# Patient Record
(demographics unavailable — no encounter records)

---

## 2025-03-27 NOTE — HISTORY OF PRESENT ILLNESS
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  Was recently admitted to [facility] 1/12/2025 with new onset A-fib with RVA.  Presents today to establish care.  The patient is under stress and that she developed palpitation went to the emergency room.  She was found to be in atrial fibrillation.  That time she had all the workup and echocardiogram and now she is doing okay.  At that time she was on a lot of stress lacking split sleep and taken a lot of coffee.  She is not short of breath at rest but sometimes she feels short of breath with exertion.  She was not allowed to exercise for now. [ESS] : 0

## 2025-03-27 NOTE — ASSESSMENT
[FreeTextEntry1] :   Distant exertion  I discussed the case in detail with the patient.  I reviewed all the notes from [facility].  Patient was admitted with atrial fibrillation most likely related to sleep deprivation and stress and extra caffeine.  The echocardiogram was unremarkable.  No evidence of pulmonary hypertension on the echocardiogram.  The chest x-ray at [facility] was normal and also the one in today.  PFT was normal with normal 6-minute walk test.  No evidence of underlying obstructive sleep apnea.  At this point and indicated to the patient that there is no indication for albuterol.  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  I discussed and need to increase her activity as tolerated gradually and to monitor her blood pressure.   Patient was instructed to continue on anticoagulation and until cleared by cardiology

## 2025-03-27 NOTE — HISTORY OF PRESENT ILLNESS
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  Was recently admitted to [facility] 1/12/2025 with new onset A-fib with RVA.  Presents today to establish care.  The patient is under stress and that she developed palpitation went to the emergency room.  She was found to be in atrial fibrillation.  That time she had all the workup and echocardiogram and now she is doing okay.  At that time she was on a lot of stress lacking split sleep and taken a lot of coffee.  She is not short of breath at rest but sometimes she feels short of breath with exertion.  She was not allowed to exercise for now. [ESS] : 0

## 2025-03-27 NOTE — HISTORY OF PRESENT ILLNESS
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  Was recently admitted to [facility] 1/12/2025 with new onset A-fib with RVA.  Presents today to establish care.  The patient is under stress and that she developed palpitation went to the emergency room.  She was found to be in atrial fibrillation.  That time she had all the workup and echocardiogram and now she is doing okay.  At that time she was on a lot of stress lacking split sleep and taken a lot of coffee.  She is not short of breath at rest but sometimes she feels short of breath with exertion.  She was not allowed to exercise for now. [ESS] : 0

## 2025-03-27 NOTE — ASSESSMENT
[FreeTextEntry1] :   Distant exertion  I discussed the case in detail with the patient.  I reviewed all the notes from [facility].  Patient was admitted with atrial fibrillation most likely related to sleep deprivation and stress and extra caffeine.  The echocardiogram was unremarkable.  No evidence of pulmonary hypertension on the echocardiogram.  The chest x-ray at [facility] was normal and also the one in today.  PFT was normal with normal 6-minute walk test.  No evidence of underlying obstructive sleep apnea.  At this point and indicated to the patient that there is no indication for albuterol.  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  I discussed and need to increase her activity as tolerated gradually and to monitor her blood pressure.   Patient was instructed to continue on anticoagulation and until cleared by cardiology

## 2025-07-16 NOTE — DISCUSSION/SUMMARY
[de-identified] : Chief complaint Follow-up.  HPI  patient is here today for follow-up of her right arm pain and weakness.  Her pain is improved. She now complains 5 out of 10 pain.  She still having some weakness but she is able to move the arm.  She comes in with the MRI.  No issues gait or balance by bladder incontinence.  No issues with fine motor activity.  Pt is NAD, AAOX3 skin is intact no palpable stepoff 5/5 motor strength BUE right tricep is 3+ out of 5 SILT C5-T1 BUE POSITIVE SPURLING Negative lara normal rapid  test equal reflexes bicep/BR/tricep hand wwp bcr  Imaging I reviewed MRI of the cervical spine demonstrating C3-C6 DDD most of the stenosis at the C5-C6 level.  There is no severe stenosis at C6-C7  Treatment note I discussed at length with the patient into the condition.  At this time we will get her to see Dr. Juarez for consideration of an EMG as well as an epidural.  Patient on Eliquis.  She will stick with Tylenol.  She continue physical therapy because she is currently on gabapentin she can increase her dose of gabapentin.  I will see her back in a month.  All the patient's questions were signs.